# Patient Record
(demographics unavailable — no encounter records)

---

## 2025-07-06 NOTE — PHYSICAL EXAM
[Alert] : alert [Normocephalic] : normocephalic [Flat Open Anterior [place]] : flat open anterior fontanelle [PERRL] : PERRL [Red Reflex Bilateral] : red reflex bilateral [Normally Placed Ears] : normally placed ears [Auricles Well Formed] : auricles well formed [Clear Tympanic membranes] : clear tympanic membranes [Light reflex present] : light reflex present [Bony structures visible] : bony structures visible [Patent Auditory Canal] : patent auditory canal [Nares Patent] : nares patent [Palate Intact] : palate intact [Uvula Midline] : uvula midline [Supple, full passive range of motion] : supple, full passive range of motion [Symmetric Chest Rise] : symmetric chest rise [Clear to Auscultation Bilaterally] : clear to auscultation bilaterally [Regular Rate and Rhythm] : regular rate and rhythm [S1, S2 present] : S1, S2 present [+2 Femoral Pulses] : +2 femoral pulses [Soft] : soft [Bowel Sounds] : bowel sounds present [Umbilical Stump Dry, Clean, Intact] : umbilical stump dry, clean, intact [Normal external genitailia] : normal external genitalia [Central Urethral Opening] : central urethral opening [Testicles Descended Bilaterally] : testicles descended bilaterally [Patent] : patent [Normally Placed] : normally placed [No Abnormal Lymph Nodes Palpated] : no abnormal lymph nodes palpated [Symmetric Flexed Extremities] : symmetric flexed extremities [Startle Reflex] : startle reflex present [Suck Reflex] : suck reflex present [Rooting] : rooting reflex present [Palmar Grasp] : palmar grasp present [Plantar Grasp] : plantar reflex present [Symmetric Bassam] : symmetric [place] [Acute Distress] : no acute distress [Icteric sclera] : nonicteric sclera [Discharge] : no discharge [Palpable Masses] : no palpable masses [Murmurs] : no murmurs [Tender] : nontender [Distended] : not distended [Hepatomegaly] : no hepatomegaly [Splenomegaly] : no splenomegaly [Valadez-Ortolani] : negative Valadez-Ortolani [Spinal Dimple] : no spinal dimple [Tuft of Hair] : no tuft of hair [Jaundice] : not jaundice

## 2025-07-06 NOTE — HISTORY OF PRESENT ILLNESS
[Breast milk] : breast milk [Expressed Breast milk ___oz/feed] : [unfilled] oz of expressed breast milk per feed [Hours between feeds ___] : Child is fed every [unfilled] hours [Vitamins ___] : Patient takes [unfilled] vitamins daily [Well-balanced] : well-balanced [Normal] : Normal [___ voids per day] : [unfilled] voids per day [Frequency of stools: ___] : Frequency of stools: [unfilled]  stools [per day] : per day. [Green/brown] : green/brown [Pasty] : pasty [In Bassinet/Crib] : sleeps in bassinet/crib [On back] : sleeps on back [No] : No cigarette smoke exposure [Water heater temperature set at <120 degrees F] : Water heater temperature set at <120 degrees F [Rear facing car seat in back seat] : Rear facing car seat in back seat [Carbon Monoxide Detectors] : Carbon monoxide detectors at home [Smoke Detectors] : Smoke detectors at home. [NO] : No [Co-sleeping] : no co-sleeping [Loose bedding, pillow, toys, and/or bumpers in crib] : no loose bedding, pillow, toys, and/or bumpers in crib [Exposure to electronic nicotine delivery system] : No exposure to electronic nicotine delivery system [Hepatitis B Vaccine Given] : Hepatitis B vaccine not given [Nirsevimab Given] : Nirsevimab not given [FreeTextEntry7] : Five day old baby boy presents for well check visit. Has been doing well since discharge from the  nursery.  [FreeTextEntry9] : Normal activity level.  [FreeTextEntry1] : Hospital Course: Date/Time of Admission 2025 14:52 Admission Weight (KILOGRAMS) 4.07 kg Admission Weight (GRAMS) 4070 Gm Date/Time of Birth 2025 14:52 Admission Weight (POUNDS) 8 Admission Weight (OUNCES) 15.565 Ounce(s) Admission Height (CENTIMETERS) 53.5 cm Admission Height (INCHES) 21.06 Inch(s) Gestational Age at Birth (WEEKS) 39.6 Week(s) Admission Information Birth Sex: Male  APGAR Scores: 1min:9 5min: 9 Infant Measurement Large for gestational age (LGA) Hospital Course 39.5 wk LGA male born via  to a 34y/o  mother. No significant maternal or prenatal history. Delivery complicated by maternal fever and chorioamnionitis. Maternal labs include Blood Type A+, HIV - , RPR NR , Rubella I , Hep B - , GBS negative . Received Zosyn x1 for chorio. AROM at 21:00 on  with clear fluids (ROM hours: 17H 50M). Baby emerged vigorous, crying, was w/d/s/s with APGARS of 9/9. Mom plans to initiate breastfeeding and formula feed, declines Hep B vaccine and declines circ. Highest maternal temp: 38.1. EOS 0.30.  :  TOB: 14:52  Since admission to the mother baby unit, baby has been feeding, voiding, and stooling appropriately. Vitals remained stable during admission. Baby received routine  care. LGA with dsticks within normal  limits prior to discharge; Discharge weight was 3880 g Weight Change Percentage: -4.67  Discharge Bilirubin Sternum 7 at 33 hours of life, which is below phototherapy threshold  See below for hepatitis B vaccine status, hearing screen and CCHD results. G6PD [place], with results pending at time of discharge. Stable for discharge home with instructions to follow up with pediatrician in 1-2 days.  Final COLLIN: 2025  Prenatal Lab Tests/Results: HBsAG: HBsAG Results: negative HIV: HIV Results: negative VDRL: VDRL/RPR Results: negative Rubella: Rubella Results: immune Rubeola: Rubeola Results: unknown GBS Bacteriuria: GBS Bacteriuria Results: unknown GBS Screen 1st Trimester: GBS Screen 1st Trimester Results: unknown GBS 36 Weeks: GBS 36 Weeks Results: negative Blood Type: Blood Type: A positive  Maternal Information LABOR AND DELIVERY ROM: Length Of Time Ruptured (after admission):: 17 Hour(s) 52 Minute(s)  Mode of Delivery: Vaginal Delivery  Complications: abnormal fetal heart rate tracing, chorioamnionitis, prolonged rupture of membranes Discharge Data: Discharge Date 2025 Discharge Information Weight (grams): 3880 Weight (pounds): 8 Weight (ounces): 8.862 % weight change = -4.67% [ Based on Admission weight in grams = 4070.00(2025 17:10), Discharge weight in grams = 3880.00(2025 00:15)] Height (centimeters): 53.5 Height in inches = 21.1 [ Based on Height in centimeters = 53.50(2025 16:22)] Head Circumference (centimeters): 35  Diagnosis: LGA (large for gestational age) infant Assessment and Plan of Treatment: LGA with dsticks within normal  limits prior to discharge;  Screens: Critical Congenital Heart Defect (CCHD) CCHD Screen []: Initial Pre-Ductal SpO2(%): 100 Post-Ductal SpO2(%): 100 SpO2 Difference(Pre MINUS Post): 0 Extremities Used: Right Hand, Right Foot Result: Passed Follow up: Normal Screen- (No follow-up needed) Infant Screen Screen#: [id number] Screen Date: N/A Screen Comment: N/A Final Hearing Screen Right ear hearing screen completed date: 2025 Right ear screen method: EOAE (evoked otoacoustic emission) Right ear screen result: Passed Right ear screen comment: N/A Left ear hearing screen completed date: 2025 Left ear screen method: EOAE (evoked otoacoustic emission) Left ear screen result: Passed Left ear screen comments: N/A Transcutaneous Bilirubin Site: Sternum (2025 00:15) Bilirubin: 7 (2025 00:15) Bilirubin: 5 (2025 15:30) Site: Sternum (2025 15:30)  Discharge Exam: GEN: NAD alert active HEENT: AFOF, +RR b/l, MMM CHEST: nml s1/s2, RRR, no murmur, lungs cta b/l Abd: soft/nt/nd +bs no hsm umbilical stump c/d/i Ext: neg Ortolani/Valadez, no crepitus, FROMx4 : normal genitalia, visually patent anus Neuro: +grasp/suck/symmetric blanca Skin: no abnormal rash

## 2025-07-06 NOTE — HISTORY OF PRESENT ILLNESS
[Breast milk] : breast milk [Expressed Breast milk ___oz/feed] : [unfilled] oz of expressed breast milk per feed [Hours between feeds ___] : Child is fed every [unfilled] hours [Vitamins ___] : Patient takes [unfilled] vitamins daily [Well-balanced] : well-balanced [Normal] : Normal [___ voids per day] : [unfilled] voids per day [Frequency of stools: ___] : Frequency of stools: [unfilled]  stools [per day] : per day. [Green/brown] : green/brown [Pasty] : pasty [In Bassinet/Crib] : sleeps in bassinet/crib [On back] : sleeps on back [No] : No cigarette smoke exposure [Water heater temperature set at <120 degrees F] : Water heater temperature set at <120 degrees F [Rear facing car seat in back seat] : Rear facing car seat in back seat [Carbon Monoxide Detectors] : Carbon monoxide detectors at home [Smoke Detectors] : Smoke detectors at home. [NO] : No [Co-sleeping] : no co-sleeping [Loose bedding, pillow, toys, and/or bumpers in crib] : no loose bedding, pillow, toys, and/or bumpers in crib [Exposure to electronic nicotine delivery system] : No exposure to electronic nicotine delivery system [Hepatitis B Vaccine Given] : Hepatitis B vaccine not given [Nirsevimab Given] : Nirsevimab not given [FreeTextEntry7] : Five day old baby boy presents for well check visit. Has been doing well since discharge from the  nursery.  [FreeTextEntry9] : Normal activity level.  [FreeTextEntry1] : Hospital Course: Date/Time of Admission 2025 14:52 Admission Weight (KILOGRAMS) 4.07 kg Admission Weight (GRAMS) 4070 Gm Date/Time of Birth 2025 14:52 Admission Weight (POUNDS) 8 Admission Weight (OUNCES) 15.565 Ounce(s) Admission Height (CENTIMETERS) 53.5 cm Admission Height (INCHES) 21.06 Inch(s) Gestational Age at Birth (WEEKS) 39.6 Week(s) Admission Information Birth Sex: Male  APGAR Scores: 1min:9 5min: 9 Infant Measurement Large for gestational age (LGA) Hospital Course 39.5 wk LGA male born via  to a 34y/o  mother. No significant maternal or prenatal history. Delivery complicated by maternal fever and chorioamnionitis. Maternal labs include Blood Type A+, HIV - , RPR NR , Rubella I , Hep B - , GBS negative . Received Zosyn x1 for chorio. AROM at 21:00 on  with clear fluids (ROM hours: 17H 50M). Baby emerged vigorous, crying, was w/d/s/s with APGARS of 9/9. Mom plans to initiate breastfeeding and formula feed, declines Hep B vaccine and declines circ. Highest maternal temp: 38.1. EOS 0.30.  :  TOB: 14:52  Since admission to the mother baby unit, baby has been feeding, voiding, and stooling appropriately. Vitals remained stable during admission. Baby received routine  care. LGA with dsticks within normal  limits prior to discharge; Discharge weight was 3880 g Weight Change Percentage: -4.67  Discharge Bilirubin Sternum 7 at 33 hours of life, which is below phototherapy threshold  See below for hepatitis B vaccine status, hearing screen and CCHD results. G6PD [place], with results pending at time of discharge. Stable for discharge home with instructions to follow up with pediatrician in 1-2 days.  Final COLLIN: 2025  Prenatal Lab Tests/Results: HBsAG: HBsAG Results: negative HIV: HIV Results: negative VDRL: VDRL/RPR Results: negative Rubella: Rubella Results: immune Rubeola: Rubeola Results: unknown GBS Bacteriuria: GBS Bacteriuria Results: unknown GBS Screen 1st Trimester: GBS Screen 1st Trimester Results: unknown GBS 36 Weeks: GBS 36 Weeks Results: negative Blood Type: Blood Type: A positive  Maternal Information LABOR AND DELIVERY ROM: Length Of Time Ruptured (after admission):: 17 Hour(s) 52 Minute(s)  Mode of Delivery: Vaginal Delivery  Complications: abnormal fetal heart rate tracing, chorioamnionitis, prolonged rupture of membranes Discharge Data: Discharge Date 2025 Discharge Information Weight (grams): 3880 Weight (pounds): 8 Weight (ounces): 8.862 % weight change = -4.67% [ Based on Admission weight in grams = 4070.00(2025 17:10), Discharge weight in grams = 3880.00(2025 00:15)] Height (centimeters): 53.5 Height in inches = 21.1 [ Based on Height in centimeters = 53.50(2025 16:22)] Head Circumference (centimeters): 35  Diagnosis: LGA (large for gestational age) infant Assessment and Plan of Treatment: LGA with dsticks within normal  limits prior to discharge;  Screens: Critical Congenital Heart Defect (CCHD) CCHD Screen []: Initial Pre-Ductal SpO2(%): 100 Post-Ductal SpO2(%): 100 SpO2 Difference(Pre MINUS Post): 0 Extremities Used: Right Hand, Right Foot Result: Passed Follow up: Normal Screen- (No follow-up needed) Infant Screen Screen#: [id number] Screen Date: N/A Screen Comment: N/A Final Hearing Screen Right ear hearing screen completed date: 2025 Right ear screen method: EOAE (evoked otoacoustic emission) Right ear screen result: Passed Right ear screen comment: N/A Left ear hearing screen completed date: 2025 Left ear screen method: EOAE (evoked otoacoustic emission) Left ear screen result: Passed Left ear screen comments: N/A Transcutaneous Bilirubin Site: Sternum (2025 00:15) Bilirubin: 7 (2025 00:15) Bilirubin: 5 (2025 15:30) Site: Sternum (2025 15:30)  Discharge Exam: GEN: NAD alert active HEENT: AFOF, +RR b/l, MMM CHEST: nml s1/s2, RRR, no murmur, lungs cta b/l Abd: soft/nt/nd +bs no hsm umbilical stump c/d/i Ext: neg Ortolani/Valadez, no crepitus, FROMx4 : normal genitalia, visually patent anus Neuro: +grasp/suck/symmetric blanca Skin: no abnormal rash

## 2025-07-06 NOTE — PHYSICAL EXAM
[Alert] : alert [Normocephalic] : normocephalic [Flat Open Anterior [place]] : flat open anterior fontanelle [PERRL] : PERRL [Red Reflex Bilateral] : red reflex bilateral [Normally Placed Ears] : normally placed ears [Auricles Well Formed] : auricles well formed [Clear Tympanic membranes] : clear tympanic membranes [Light reflex present] : light reflex present [Bony structures visible] : bony structures visible [Patent Auditory Canal] : patent auditory canal [Nares Patent] : nares patent [Palate Intact] : palate intact [Uvula Midline] : uvula midline [Supple, full passive range of motion] : supple, full passive range of motion [Symmetric Chest Rise] : symmetric chest rise [Clear to Auscultation Bilaterally] : clear to auscultation bilaterally [Regular Rate and Rhythm] : regular rate and rhythm [S1, S2 present] : S1, S2 present [+2 Femoral Pulses] : +2 femoral pulses [Soft] : soft [Bowel Sounds] : bowel sounds present [Umbilical Stump Dry, Clean, Intact] : umbilical stump dry, clean, intact [Normal external genitailia] : normal external genitalia [Central Urethral Opening] : central urethral opening [Testicles Descended Bilaterally] : testicles descended bilaterally [Patent] : patent [Normally Placed] : normally placed [No Abnormal Lymph Nodes Palpated] : no abnormal lymph nodes palpated [Symmetric Flexed Extremities] : symmetric flexed extremities [Startle Reflex] : startle reflex present [Suck Reflex] : suck reflex present [Rooting] : rooting reflex present [Palmar Grasp] : palmar grasp present [Plantar Grasp] : plantar reflex present [Symmetric Bassam] : symmetric [place] [Acute Distress] : no acute distress [Icteric sclera] : nonicteric sclera [Discharge] : no discharge [Palpable Masses] : no palpable masses [Murmurs] : no murmurs [Tender] : nontender [Distended] : not distended [Hepatomegaly] : no hepatomegaly [Splenomegaly] : no splenomegaly [Valadez-Ortolani] : negative Valadez-Ortolani [Spinal Dimple] : no spinal dimple [Tuft of Hair] : no tuft of hair [Jaundice] : not jaundice

## 2025-07-06 NOTE — DISCUSSION/SUMMARY
[Normal Growth] : growth [Normal Development] : developmental [No Elimination Concerns] : elimination [Continue Regimen] : feeding [No Skin Concerns] : skin [Normal Sleep Pattern] : sleep [Anticipatory Guidance Given] : Anticipatory guidance addressed as per the history of present illness section [Parent/Guardian] : Parent/Guardian [Mother] : mother [Father] : father [Parental Concerns Addressed] : Parental concerns addressed [Hepatitis B In Hospital] : Hepatitis B not administered while in the hospital [FreeTextEntry1] : Gary is a five-day-old baby boy here today for  visit. Baby is feeding, voiding, stooling, but is still 10 % below birth weight. No acute concerns.  Recommend exclusive breastfeeding, 8-12 feedings per day. Mother should continue prenatal vitamins and avoid alcohol. If formula is needed, recommend iron-fortified formulations every 2-3 hrs. When in car, patient should be in rear-facing car seat in back seat. Air dry umbilical stump. Put baby to sleep on back, in own crib with no loose or soft bedding. Limit baby's exposure to others, especially those with fever or unknown vaccine status.  Will follow-up in two days for weight check.

## 2025-07-13 NOTE — DISCUSSION/SUMMARY

## 2025-07-13 NOTE — HISTORY OF PRESENT ILLNESS
[de-identified] : Weight Check [FreeTextEntry6] : Gary is a ten-day-old male here for weight check. Has been doing well since the last visit. Has gained 48 grams/day since the last visit. Has not surpassed birth weight. Continues to breastfeed/formula every 2-3 hours, on demand. Gives Vitamin D daily. Good 6-8 wet diapers. Good yellow, seedy stools. Continuing to have good activity. No other concerns.

## 2025-07-13 NOTE — HISTORY OF PRESENT ILLNESS
[de-identified] : Weight Check [FreeTextEntry6] : Gary is a ten-day-old male here for weight check. Has been doing well since the last visit. Has gained 48 grams/day since the last visit. Has not surpassed birth weight. Continues to breastfeed/formula every 2-3 hours, on demand. Gives Vitamin D daily. Good 6-8 wet diapers. Good yellow, seedy stools. Continuing to have good activity. No other concerns.

## 2025-07-20 NOTE — HISTORY OF PRESENT ILLNESS
[Breast milk] : breast milk [Formula ___ oz/feed] : [unfilled] oz of formula per feed [Hours between feeds ___] : Child is fed every [unfilled] hours [___ Feeding per 24 hrs] : a  total of [unfilled] feedings in 24 hours [Vitamins ___] : Patient takes [unfilled] vitamins daily [Well-balanced] : well-balanced [Normal] : Normal [___ voids per day] : [unfilled] voids per day [Frequency of stools: ___] : Frequency of stools: [unfilled]  stools [per day] : per day. [In Bassinet/Crib] : sleeps in bassinet/crib [On back] : sleeps on back [No] : No cigarette smoke exposure [Water heater temperature set at <120 degrees F] : Water heater temperature set at <120 degrees F [Rear facing car seat in back seat] : Rear facing car seat in back seat [Carbon Monoxide Detectors] : Carbon monoxide detectors at home [Smoke Detectors] : Smoke detectors at home. [NO] : No [Co-sleeping] : no co-sleeping [Loose bedding, pillow, toys, and/or bumpers in crib] : no loose bedding, pillow, toys, and/or bumpers in crib [Exposure to electronic nicotine delivery system] : No exposure to electronic nicotine delivery system [FreeTextEntry7] : Two week old baby boy presents for weight check. Has been doing well since the last visit.

## 2025-07-20 NOTE — DISCUSSION/SUMMARY
[Normal Growth] : growth [Normal Development] : developmental [No Elimination Concerns] : elimination [Continue Regimen] : feeding [No Skin Concerns] : skin [Normal Sleep Pattern] : sleep [Anticipatory Guidance Given] : Anticipatory guidance addressed as per the history of present illness section [ Transition] :  transition [ Care] :  care [Nutritional Adequacy] : nutritional adequacy [Parental Well-Being] : parental well-being [Safety] : safety [Parent/Guardian] : Parent/Guardian [Mother] : mother [Father] : father [Parental Concerns Addressed] : Parental concerns addressed [FreeTextEntry1] : Recommend exclusive breastfeeding, 8-12 feedings per day. Mother should continue prenatal vitamins and avoid alcohol. If formula is needed, recommend iron-fortified formulations every 2-3 hrs. When in car, patient should be in rear-facing car seat in back seat. Put baby to sleep on back, in own crib with no loose or soft bedding. Limit baby's exposure to others, especially those with fever or unknown vaccine status.

## 2025-07-20 NOTE — PHYSICAL EXAM
[Bony structures visible] : bony structures visible [Patent Auditory Canal] : patent auditory canal [Umbilical Stump Dry, Clean, Intact] : umbilical stump dry, clean, intact [Alert] : alert [Acute Distress] : no acute distress [Normocephalic] : normocephalic [Flat Open Anterior [place]] : flat open anterior fontanelle [Icteric sclera] : nonicteric sclera [PERRL] : PERRL [Red Reflex Bilateral] : red reflex bilateral [Normally Placed Ears] : normally placed ears [Auricles Well Formed] : auricles well formed [Clear Tympanic membranes] : clear tympanic membranes [Light reflex present] : light reflex present [Bony landmarks visible] : bony landmarks visible [Discharge] : no discharge [Nares Patent] : nares patent [Palate Intact] : palate intact [Uvula Midline] : uvula midline [Supple, full passive range of motion] : supple, full passive range of motion [Palpable Masses] : no palpable masses [Symmetric Chest Rise] : symmetric chest rise [Clear to Auscultation Bilaterally] : clear to auscultation bilaterally [Regular Rate and Rhythm] : regular rate and rhythm [S1, S2 present] : S1, S2 present [Murmurs] : no murmurs [+2 Femoral Pulses] : +2 femoral pulses [Soft] : soft [Tender] : nontender [Distended] : not distended [Bowel Sounds] : bowel sounds present [Hepatomegaly] : no hepatomegaly [Splenomegaly] : no splenomegaly [Normal external genitailia] : normal external genitalia [Central Urethral Opening] : central urethral opening [Testicles Descended Bilaterally] : testicles descended bilaterally [Patent] : patent [Normally Placed] : normally placed [No Abnormal Lymph Nodes Palpated] : no abnormal lymph nodes palpated [Valadez-Ortolani] : negative Valadez-Ortolani [Symmetric Flexed Extremities] : symmetric flexed extremities [Spinal Dimple] : no spinal dimple [Tuft of Hair] : no tuft of hair [Straight] : straight [Startle Reflex] : startle reflex present [Suck Reflex] : suck reflex present [Rooting] : rooting reflex present [Palmar Grasp] : palmar grasp reflex present [Plantar Grasp] : plantar grasp reflex present [Symmetric Bassam] : symmetric [place] [Jaundice] : not jaundice

## 2025-07-29 NOTE — HISTORY OF PRESENT ILLNESS
[Mother] : mother [Breast milk] : breast milk [Expressed Breast milk ___oz/feed] : [unfilled] oz of expressed breast milk per feed [Formula ___ oz/feed] : [unfilled] oz of formula per feed [Hours between feeds ___] : Child is fed every [unfilled] hours [Vitamins ___] : Patient takes [unfilled] vitamins daily [Well-balanced] : well-balanced [de-identified] : Breastfeeding more, and supplementing as needed. Has been spitting up a little.

## 2025-07-29 NOTE — HISTORY OF PRESENT ILLNESS
[Mother] : mother [Breast milk] : breast milk [Expressed Breast milk ___oz/feed] : [unfilled] oz of expressed breast milk per feed [Formula ___ oz/feed] : [unfilled] oz of formula per feed [Hours between feeds ___] : Child is fed every [unfilled] hours [Vitamins ___] : Patient takes [unfilled] vitamins daily [Well-balanced] : well-balanced [de-identified] : Breastfeeding more, and supplementing as needed. Has been spitting up a little.